# Patient Record
(demographics unavailable — no encounter records)

---

## 2025-02-10 NOTE — DISCUSSION/SUMMARY
[Medication Risks Reviewed] : Medication risks reviewed [de-identified] : Discussed options, Sub coracoid impingement Start Physical Therapy Plan fight shoulder SAC  ----------------------------------------------------------------------------  Large joint corticosteroid injection given: Right shoulder subacromial  Patient indicated for injection after trial of rest, OTC medications including aspirin, Ibuprofen, Aleve etc or prescription NSAIDS, and/or exercises at home and/ or physical therapy without satisfactory response.  Patient has symptoms including pain, swelling, and/or decreased mobility in the joint. The risks, benefits, and alternatives to corticosteroid injection were explained in full to the patient, including but not limited to infection, sepsis, bleeding, scarring, skin discoloration, temporary increase in pain, syncopal episode, failure to resolve symptoms, allergic reaction, symptom recurrence, and elevation of blood sugar in diabetics. Patient understood the risks. All questions were answered. After discussion of options, patient requested an injection.   Oral informed consent was obtained and sterile technique was utilized for the procedure including the preparation of the solutions used for the injection and betadine followed by alcohol prep to the injection site. Anesthesia was given with ethyl chloride sprayed topically. The injection was delivered. Patient tolerated the procedure well.   Post Procedure Instructions: Patient was advised to call if redness, pain, or fever occur and apply ice for 15 min on and 15 min off later today  Medications delivered: Kenalog: 10 mg, Lidocaine: 4 cc, Marcaine: 4 cc.  ----------------------------------------------------------------------------   All relevant imaging studies pertinent to today's visit, including x-rays, MRI's and/or other advanced imaging studies (CT/etc) were independently interpreted and reviewed with the patient as needed. Implications of the studies together with the patient's clinical picture were discussed to formulate a working diagnosis and management options were detailed.   The patient and/or guardian was advised of the diagnosis.  The natural history of the pathology was explained in full. All questions were answered.  The risks and benefits of conservative and interventional treatment alternatives were explained to the patient   The patient and/or guardian was advised if any advanced diagnostic/imaging study (MRI/CT/etc) is ordered to evaluate potential pathology in the affected area(s), they should follow up in the office to review the results of the study and determine further management that may be indicated.

## 2025-02-10 NOTE — IMAGING
[Right] : right shoulder [There are no fractures, subluxations or dislocations. No significant abnormalities are seen] : There are no fractures, subluxations or dislocations. No significant abnormalities are seen [Type 2 acromion] : Type 2 acromion [de-identified] :   ----------------------------------------------------------------------------   Right shoulder exam:   Skin: no significant pertinent finding Inspection: no obvious deformity, no obvious masses, no swelling, no effusion, no atrophy ROM:    FF: 180 +pain with resisted     ER: 70    IR: T12 Tenderness: +sub coracoid     (+) Anterior/Biceps:    (mild) Posterior    (neg) Lateral    (neg) Trapezius    (neg) Scapula    (min) AC joint    (neg) Crepitus with ROM Stability:    (neg) Translation    (neg) Apprehension    (neg) Clicking Additional tests: +drop arm +mild pain w belly press    (+) Neer's    (+) Hawkin's    (neg) Florez's    (neg) Speed    (neg) Cross chest adduction Strength:    FF: 5/5    ER: 5/5    IR: 5/5    Biceps: 5/5    Triceps: 5/5    Distal: 5/5 Neuro: In tact to light touch throughout Vascularity: Extremity warm and well perfused   [FreeTextEntry1] : small calcific density at coracoid tip

## 2025-02-10 NOTE — HISTORY OF PRESENT ILLNESS
[0] : 0 [6] : 6 [Dull/Aching] : dull/aching [Localized] : localized [de-identified] : This is Ms. NONA PARSONS  a 47 year old female who comes in today complaining of Rt shoulder. For about 3 weeks she has been feeling dull aching pain mostly when resting or sleeping. states nothing she does make the pain worse takes Aleve and Motrin. mention the pain goes into the bicep as well. + nighttime sx. 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

## 2025-03-20 NOTE — IMAGING
[Right] : right shoulder [There are no fractures, subluxations or dislocations. No significant abnormalities are seen] : There are no fractures, subluxations or dislocations. No significant abnormalities are seen [Type 2 acromion] : Type 2 acromion [de-identified] :   ----------------------------------------------------------------------------   Right shoulder exam:   Skin: no significant pertinent finding Inspection: no obvious deformity, no obvious masses, no swelling, no effusion, no atrophy ROM:    FF: 180 +pain with resisted     ER: 70    IR: T12 Tenderness: +sub coracoid     (+) Anterior/Biceps:    (mild) Posterior    (neg) Lateral    (neg) Trapezius    (neg) Scapula    (++) AC joint  (symmetric)    (neg) Crepitus with ROM Stability:    (neg) Translation    (neg) Apprehension    (neg) Clicking Additional tests: +drop arm +mild pain w belly press    (+) Neer's    (+) Hawkin's    (neg) Florez's    (neg) Speed    (neg) Cross chest adduction    (+) pain with resisted external rotation Strength:    FF: 5/5    ER: 5/5    IR: 5/5    Biceps: 5/5    Triceps: 5/5    Distal: 5/5 Neuro: In tact to light touch throughout Vascularity: Extremity warm and well perfused   [FreeTextEntry1] : small calcific density at coracoid tip

## 2025-03-20 NOTE — HISTORY OF PRESENT ILLNESS
[8] : 8 [6] : 6 [Dull/Aching] : dull/aching [Localized] : localized [Physical therapy] : physical therapy [de-identified] : This is Ms. NONA PARSONS  a 47 year old female who comes in today complaining of Rt shoulder. For about 3 weeks she has been feeling dull aching pain mostly when resting or sleeping. states nothing she does make the pain worse takes Aleve and Motrin. mention the pain goes into the bicep as well. 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

## 2025-03-20 NOTE — REASON FOR VISIT
[FreeTextEntry2] : F/u Rt shoulder - Pt noted about  50%  relief from prev R shoulder CSI inj and has been doing PT. Pt having + night pain

## 2025-03-20 NOTE — DISCUSSION/SUMMARY
[Medication Risks Reviewed] : Medication risks reviewed [de-identified] : Pt has failed conservative treatment including CSI inj and 6 weeks of PT and pt continued to have persistent pain and night pain she is indicated for MRI  R shoulder to r/o rotator cuff injury and  sub-coracoid impingement  Rx: MDP f/u post MRI  ----------------------------------------------------------------------------   All relevant imaging studies pertinent to today's visit, including x-rays, MRI's and/or other advanced imaging studies (CT/etc) were independently interpreted and reviewed with the patient as needed. Implications of the studies together with the patient's clinical picture were discussed to formulate a working diagnosis and management options were detailed.   The patient and/or guardian was advised of the diagnosis.  The natural history of the pathology was explained in full. All questions were answered.  The risks and benefits of conservative and interventional treatment alternatives were explained to the patient   The patient and/or guardian was advised if any advanced diagnostic/imaging study (MRI/CT/etc) is ordered to evaluate potential pathology in the affected area(s), they should follow up in the office to review the results of the study and determine further management that may be indicated.

## 2025-04-10 NOTE — IMAGING
[Right] : right shoulder [There are no fractures, subluxations or dislocations. No significant abnormalities are seen] : There are no fractures, subluxations or dislocations. No significant abnormalities are seen [Type 2 acromion] : Type 2 acromion [de-identified] :   ----------------------------------------------------------------------------   Right shoulder exam:   Skin: no significant pertinent finding Inspection: no obvious deformity, no obvious masses, no swelling, no effusion, no atrophy ROM:    FF: 180 +pain with resisted     ER: 70    IR: T12 Tenderness: +sub coracoid     (+) Anterior/Biceps:    (mild) Posterior    (neg) Lateral    (neg) Trapezius    (neg) Scapula    (++) AC joint  (symmetric)    (neg) Crepitus with ROM Stability:    (neg) Translation    (neg) Apprehension    (neg) Clicking Additional tests: +drop arm +mild pain w belly press    (+) Neer's    (+) Hawkin's    (neg) Florez's    (neg) Speed    (neg) Cross chest adduction    (+) pain with resisted external rotation Strength:    FF: 5/5    ER: 5/5    IR: 5/5    Biceps: 5/5    Triceps: 5/5    Distal: 5/5 Neuro: In tact to light touch throughout Vascularity: Extremity warm and well perfused   [FreeTextEntry1] : small calcific density at coracoid tip

## 2025-04-10 NOTE — REASON FOR VISIT
[FreeTextEntry2] : F/u Rt shoulder. noted some improvement in pain. Still persistent pain and limited function. MRI was denied. Has now done 6 wks of PT.

## 2025-04-10 NOTE — HISTORY OF PRESENT ILLNESS
[5] : 5 [Dull/Aching] : dull/aching [Localized] : localized [Physical therapy] : physical therapy [de-identified] : This is Ms. NONA PARSONS  a 47 year old female who comes in today complaining of Rt shoulder. For about 3 weeks she has been feeling dull aching pain mostly when resting or sleeping. states nothing she does make the pain worse takes Aleve and Motrin. mention the pain goes into the bicep as well. 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

## 2025-04-24 NOTE — HISTORY OF PRESENT ILLNESS
[5] : 5 [Dull/Aching] : dull/aching [Localized] : localized [Physical therapy] : physical therapy [de-identified] : This is Ms. NONA PARSONS  a 47 year old female who comes in today complaining of Rt shoulder. For about 3 weeks she has been feeling dull aching pain mostly when resting or sleeping. states nothing she does make the pain worse takes Aleve and Motrin. mention the pain goes into the bicep as well. + nighttime sx. 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

## 2025-04-24 NOTE — DISCUSSION/SUMMARY
[Surgical risks reviewed] : Surgical risks reviewed [de-identified] : Reviewed MRI of the R shoulder  Discussed options including sx management to address her rotator cuff tearing Discussed RBA, rehab, and recovery Pt wishes to proceed with this Plan for R shoulder arthroscopy, Rotator cuff debridement vs repair, possible [place] patch, SAD, GHD Pt sensitive touch over AC joint, some pain B/l, not convinced that this is contributing to her pathology, therefore discussed will not pursue DCE at this time. MRI findings in this region are not significant    ----------------------------------------------------------------------------   The patient was advised of the diagnosis.  The natural history of the pathology was explained in full to the patient. All questions were answered.  The risks and benefits of surgical and non-surgical treatment were explained in full to the patient.  The patient demonstrated a full understanding of the surgical and non-surgical options.  The risks of surgery were outlined in full to the patient including but not limited to pain, stiffness, bleeding, scarring, infection, neurologic injury, vascular injury, failure to resolve symptoms, symptom recurrence, the need for further surgery, non-healing, implant failure, intraoperative fracture, wound breakdown, deep vein thrombosis, pulmonary embolism, anesthesia complications and even death.  The patient understood all the risks and accepted them and understood that other complications could occur that are not mentioned above.  The intraoperative plan, post-operative plan, post-operative expectations and limitations were explained in full.  Importance of postoperative rehabilitation and restriction compliance was explained and emphasized. Expectations from non-surgical treatment were explained in full as well.  The patient demonstrated a complete understanding of the treatment detailed, the risks and alternatives.   Brace will be needed for patient pre and postoperatively for stabilization procedures and Rx will be provided as indicated     ----------------------------------------------------------------------------   All relevant imaging studies pertinent to today's visit, including x-rays, MRI's and/or other advanced imaging studies (CT/etc) were independently interpreted and reviewed with the patient as needed. Implications of the studies together with the patient's clinical picture were discussed to formulate a working diagnosis and management options were detailed.   The patient and/or guardian was advised of the diagnosis.  The natural history of the pathology was explained in full. All questions were answered.  The risks and benefits of conservative and interventional treatment alternatives were explained to the patient   The patient and/or guardian was advised if any advanced diagnostic/imaging study (MRI/CT/etc) is ordered to evaluate potential pathology in the affected area(s), they should follow up in the office to review the results of the study and determine further management that may be indicated.

## 2025-04-24 NOTE — DATA REVIEWED
[MRI] : MRI [Right] : of the right [Shoulder] : shoulder [I independently reviewed and interpreted images and report] : I independently reviewed and interpreted images and report [FreeTextEntry1] : bursal sided partial rotator cuff tear, mod bursitis, GT degenerative cystic change, mild AC arthrosis

## 2025-04-24 NOTE — IMAGING
[Right] : right shoulder [There are no fractures, subluxations or dislocations. No significant abnormalities are seen] : There are no fractures, subluxations or dislocations. No significant abnormalities are seen [Type 2 acromion] : Type 2 acromion [de-identified] :   ----------------------------------------------------------------------------   Right shoulder exam:   Skin: no significant pertinent finding Inspection: no obvious deformity, no obvious masses, no swelling, no effusion, no atrophy ROM:    FF: 180 +pain with resisted     ER: 70    IR: T12 Tenderness: +sub coracoid     (+) Anterior/Biceps:    (mild) Posterior    (neg) Lateral    (neg) Trapezius    (neg) Scapula    (++) AC joint  (symmetric)    (neg) Crepitus with ROM Stability:    (neg) Translation    (neg) Apprehension    (neg) Clicking Additional tests: +drop arm +mild pain w belly press    (+) Neer's    (+) Hawkin's    (neg) Florez's    (neg) Speed    (neg) Cross chest adduction    (+) pain with resisted external rotation Strength:    FF: 5/5    ER: 5/5    IR: 5/5    Biceps: 5/5    Triceps: 5/5    Distal: 5/5 Neuro: In tact to light touch throughout Vascularity: Extremity warm and well perfused   [FreeTextEntry1] : small calcific density at coracoid tip

## 2025-04-24 NOTE — REASON FOR VISIT
[FreeTextEntry2] : MRI results Rt shoulder. Pain remains the same since last visit. Notes PT 2x weekly for about 8 weeks which has helped with ROM and strength. Denies n/t. Still having pain with sleeping